# Patient Record
Sex: MALE | Race: WHITE | Employment: FULL TIME | URBAN - METROPOLITAN AREA
[De-identification: names, ages, dates, MRNs, and addresses within clinical notes are randomized per-mention and may not be internally consistent; named-entity substitution may affect disease eponyms.]

---

## 2021-05-09 ENCOUNTER — HOSPITAL ENCOUNTER (EMERGENCY)
Age: 36
Discharge: HOME | End: 2021-05-10
Attending: EMERGENCY MEDICINE
Payer: COMMERCIAL

## 2021-05-09 DIAGNOSIS — R11.15 CYCLICAL VOMITING: Primary | ICD-10-CM

## 2021-05-09 PROCEDURE — 99283 EMERGENCY DEPT VISIT LOW MDM: CPT

## 2021-05-09 PROCEDURE — 86850 RBC ANTIBODY SCREEN: CPT

## 2021-05-09 PROCEDURE — 86901 BLOOD TYPING SEROLOGIC RH(D): CPT

## 2021-05-09 PROCEDURE — 80053 COMPREHEN METABOLIC PANEL: CPT

## 2021-05-09 PROCEDURE — 85025 COMPLETE CBC W/AUTO DIFF WBC: CPT

## 2021-05-09 PROCEDURE — 86900 BLOOD TYPING SEROLOGIC ABO: CPT

## 2021-05-09 RX ORDER — LISDEXAMFETAMINE DIMESYLATE 60 MG/1
CAPSULE ORAL
COMMUNITY

## 2021-05-09 RX ORDER — TRAZODONE HYDROCHLORIDE 50 MG/1
50 TABLET ORAL NIGHTLY
COMMUNITY

## 2021-05-09 RX ORDER — BUPROPION HYDROCHLORIDE 150 MG/1
150 TABLET ORAL EVERY MORNING
COMMUNITY

## 2021-05-09 RX ORDER — 0.9 % SODIUM CHLORIDE 0.9 %
1000 INTRAVENOUS SOLUTION INTRAVENOUS ONCE
Status: COMPLETED | OUTPATIENT
Start: 2021-05-10 | End: 2021-05-10

## 2021-05-09 RX ORDER — ONDANSETRON 2 MG/ML
4 INJECTION INTRAMUSCULAR; INTRAVENOUS ONCE
Status: COMPLETED | OUTPATIENT
Start: 2021-05-10 | End: 2021-05-10

## 2021-05-10 VITALS
SYSTOLIC BLOOD PRESSURE: 117 MMHG | HEIGHT: 70 IN | OXYGEN SATURATION: 97 % | WEIGHT: 225 LBS | RESPIRATION RATE: 15 BRPM | DIASTOLIC BLOOD PRESSURE: 88 MMHG | BODY MASS INDEX: 32.21 KG/M2 | HEART RATE: 92 BPM | TEMPERATURE: 98.9 F

## 2021-05-10 LAB
A/G RATIO: 1.4 (ref 1.1–2.2)
ABO/RH: NORMAL
ALBUMIN SERPL-MCNC: 5 G/DL (ref 3.4–5)
ALP BLD-CCNC: 91 U/L (ref 40–129)
ALT SERPL-CCNC: 58 U/L (ref 10–40)
ANION GAP SERPL CALCULATED.3IONS-SCNC: 16 MMOL/L (ref 3–16)
ANTIBODY SCREEN: NORMAL
AST SERPL-CCNC: 44 U/L (ref 15–37)
BASOPHILS ABSOLUTE: 0.1 K/UL (ref 0–0.2)
BASOPHILS RELATIVE PERCENT: 1.3 %
BILIRUB SERPL-MCNC: 0.4 MG/DL (ref 0–1)
BUN BLDV-MCNC: 18 MG/DL (ref 7–20)
CALCIUM SERPL-MCNC: 9.9 MG/DL (ref 8.3–10.6)
CHLORIDE BLD-SCNC: 99 MMOL/L (ref 99–110)
CO2: 25 MMOL/L (ref 21–32)
CREAT SERPL-MCNC: 1.3 MG/DL (ref 0.9–1.3)
EOSINOPHILS ABSOLUTE: 0.4 K/UL (ref 0–0.6)
EOSINOPHILS RELATIVE PERCENT: 3.9 %
GFR AFRICAN AMERICAN: >60
GFR NON-AFRICAN AMERICAN: >60
GLOBULIN: 3.7 G/DL
GLUCOSE BLD-MCNC: 122 MG/DL (ref 70–99)
HCT VFR BLD CALC: 47.7 % (ref 40.5–52.5)
HEMOGLOBIN: 16.6 G/DL (ref 13.5–17.5)
LYMPHOCYTES ABSOLUTE: 2.7 K/UL (ref 1–5.1)
LYMPHOCYTES RELATIVE PERCENT: 25.2 %
MCH RBC QN AUTO: 29.3 PG (ref 26–34)
MCHC RBC AUTO-ENTMCNC: 34.8 G/DL (ref 31–36)
MCV RBC AUTO: 84.3 FL (ref 80–100)
MONOCYTES ABSOLUTE: 0.8 K/UL (ref 0–1.3)
MONOCYTES RELATIVE PERCENT: 7.4 %
NEUTROPHILS ABSOLUTE: 6.7 K/UL (ref 1.7–7.7)
NEUTROPHILS RELATIVE PERCENT: 62.2 %
OCCULT BLOOD, OTHER: NORMAL
PDW BLD-RTO: 13.4 % (ref 12.4–15.4)
PH, GASTRIC: NORMAL
PLATELET # BLD: 248 K/UL (ref 135–450)
PMV BLD AUTO: 8.4 FL (ref 5–10.5)
POTASSIUM REFLEX MAGNESIUM: 4 MMOL/L (ref 3.5–5.1)
RBC # BLD: 5.66 M/UL (ref 4.2–5.9)
SODIUM BLD-SCNC: 140 MMOL/L (ref 136–145)
TOTAL PROTEIN: 8.7 G/DL (ref 6.4–8.2)
WBC # BLD: 10.7 K/UL (ref 4–11)

## 2021-05-10 PROCEDURE — 96374 THER/PROPH/DIAG INJ IV PUSH: CPT

## 2021-05-10 PROCEDURE — 2580000003 HC RX 258: Performed by: NURSE PRACTITIONER

## 2021-05-10 PROCEDURE — 6360000002 HC RX W HCPCS: Performed by: NURSE PRACTITIONER

## 2021-05-10 PROCEDURE — 82271 OCCULT BLOOD OTHER SOURCES: CPT

## 2021-05-10 RX ORDER — ONDANSETRON 4 MG/1
4 TABLET, ORALLY DISINTEGRATING ORAL EVERY 8 HOURS PRN
Qty: 20 TABLET | Refills: 0 | Status: SHIPPED | OUTPATIENT
Start: 2021-05-10

## 2021-05-10 RX ADMIN — SODIUM CHLORIDE 1000 ML: 9 INJECTION, SOLUTION INTRAVENOUS at 00:14

## 2021-05-10 RX ADMIN — ONDANSETRON 4 MG: 2 INJECTION INTRAMUSCULAR; INTRAVENOUS at 00:14

## 2021-05-10 NOTE — ED NOTES
Discussed the use of prescribed nausea medication. Encouraged patient to wait 20 to 30 minutes after taking medication before attempting to eat or drink. Recommended clear liquids only for the next 6 to 12 hours then slowly advance diet as tolerated. Patient voiced understanding. No additional questions asked. Discharge instructions reviewed with patient. Reviewed prescriptions with patient. No additional questions asked. Voiced understanding. Encouraged patient to follow up as discussed by the ED physician.      Estefani Kramer RN  05/10/21 9160

## 2021-05-11 NOTE — ED PROVIDER NOTES
[address] [place] [address]  ED  EMERGENCY DEPARTMENT ENCOUNTER      This patient was not seen and evaluated by the attending physician. Pt Name: Katherin Amos  MRN: [id number]  [place] 1985  Date of evaluation: 5/9/2021  Provider: ESAU Arroyo - CNP-C  PCP: Khai Hassan MD      History provided by the patient. CHIEFCOMPLAINT:     Chief Complaint   Patient presents with    Hematemesis     states he has had coffee ground emesis       HISTORY OF PRESENT ILLNESS:      Katherin Amos is a 30 y.o. male who presents to [address] [place] [address]  ED with complaints of hematemesis. Patient states that he had coffee-ground emesis, patient states that he been vomiting for couple days, states that this does happen frequently. Patient does admit that he smokes marijuana, does not have a GI physician. Is not anticoagulated. Denies any chest pain. He does report feeling a little bit better after vomiting here. He is here for further evaluation. No fevers. LOCATION:-  QUALITY:-  SEVERITY:-  DURATION:-  MODIFYING FACTORS:-    Nursing Notes were reviewed     REVIEW OF SYSTEMS:     Review of Systems  All systems, a total of 10, are reviewed and negative except for those that were just noted in history present illness. PAST MEDICAL HISTORY:   History reviewed. No pertinent past medical history. SURGICAL HISTORY:    History reviewed. No pertinent surgical history. CURRENT MEDICATIONS:       Discharge Medication List as of 5/10/2021 12:47 AM      CONTINUE these medications which have NOT CHANGED    Details   cariprazine hcl (VRAYLAR) 3 MG CAPS capsule Take 3 mg by mouth dailyHistorical Med      Lisdexamfetamine Dimesylate (VYVANSE) 60 MG CAPS Take by mouth. Historical Med      buPROPion (WELLBUTRIN XL) 150 MG extended release tablet Take 150 mg by mouth every morningHistorical Med      traZODone (DESYREL) 50 MG tablet Take 50 mg by mouth nightlyHistorical Med Magnesium 4.0 3.5 - 5.1 mmol/L    Chloride 99 99 - 110 mmol/L    CO2 25 21 - 32 mmol/L    Anion Gap 16 3 - 16    Glucose 122 (H) 70 - 99 mg/dL    BUN 18 7 - 20 mg/dL    CREATININE 1.3 0.9 - 1.3 mg/dL    GFR Non-African American >60 >60    GFR African American >60 >60    Calcium 9.9 8.3 - 10.6 mg/dL    Total Protein 8.7 (H) 6.4 - 8.2 g/dL    Albumin 5.0 3.4 - 5.0 g/dL    Albumin/Globulin Ratio 1.4 1.1 - 2.2    Total Bilirubin 0.4 0.0 - 1.0 mg/dL    Alkaline Phosphatase 91 40 - 129 U/L    ALT 58 (H) 10 - 40 U/L    AST 44 (H) 15 - 37 U/L    Globulin 3.7 g/dL   Occult blood gastric / duodenum   Result Value Ref Range    Occult Blood, Other Result: Negative  Normal range: Negative       pH, Gastric pH=2    TYPE AND SCREEN   Result Value Ref Range    ABO/Rh B NEG     Antibody Screen NEG          RADIOLOGY:  All x-ray studies are viewed/reviewed by me. Formal interpretations per the radiologist are as follows: No orders to display           EKG:  See EKG interpretation by an attending physician. PROCEDURES:   N/A    CRITICAL CARE TIME:   N/A    CONSULTS:  None      EMERGENCY DEPARTMENT COURSE andDIFFERENTIAL DIAGNOSIS/MDM:   Vitals:    Vitals:    05/09/21 2327 05/10/21 0111   BP: (!) 125/99 117/88   Pulse: 90 92   Resp: 18 15   Temp: 99.2 °F (37.3 °C) 98.9 °F (37.2 °C)   TempSrc: Oral Oral   SpO2: 95% 97%   Weight: 225 lb (102.1 kg)    Height: 5' 10\" (1.778 m)        Patient wasgiven the following medications:  Medications   0.9 % sodium chloride bolus (0 mLs Intravenous Stopped 5/10/21 0110)   ondansetron (ZOFRAN) injection 4 mg (4 mg Intravenous Given 5/10/21 0014)         Patient was evaluated independently by myself with the attending physician available for consultation. Patient presented to the emergency room today with complaints of vomiting.   Patient states that this happened multiple times, he does admit that he smokes marijuana, unsure if this is cannabinoid hyperemesis syndrome but does appear similar. Patient given antiemetics here in the ED as well as IV fluids. Patient did report feeling much better. His abdomen was benign on physical exam.  Laboratory studies were not significantly abnormal.  I did recommend that he follow-up with GI as an outpatient. He was discharged home in good condition, he can return to the ED for any worsening symptoms. Patient laboratory studies, radiographic imaging, and assessment were all discussed with the patient and/orpatient family. There was shared decision-making between myself as well as the patient and/or their surrogate and we are all in agreement with discharge home. There was an opportunity for questions and all questions were answered tothe best of my ability and to the satisfaction of the patient and/or patient family. FINAL IMPRESSION:      1.  Cyclical vomiting          DISPOSITION/PLAN:   DISPOSITION Decision To Discharge      PATIENT REFERRED TO:  Bill Waterman MD  [address] [place] [address]    Call   For follow up      DISCHARGE MEDICATIONS:  Discharge Medication List as of 5/10/2021 12:47 AM      START taking these medications    Details   ondansetron (ZOFRAN ODT) 4 MG disintegrating tablet Take 1 tablet by mouth every 8 hours as needed for Nausea, Disp-20 tablet, R-0Print                        (Please note thatportions of this note were completed with a voice recognition program.  Efforts were made to edit the dictations, but occasionally words are mis-transcribed.)    ESAU Longoria CNP-C (electronicallysigned)        ESAU Longoria CNP  05/10/21 2042